# Patient Record
Sex: MALE | Race: WHITE | Employment: FULL TIME | URBAN - METROPOLITAN AREA
[De-identification: names, ages, dates, MRNs, and addresses within clinical notes are randomized per-mention and may not be internally consistent; named-entity substitution may affect disease eponyms.]

---

## 2017-05-19 DIAGNOSIS — Z00.00 ROUTINE GENERAL MEDICAL EXAMINATION AT A HEALTH CARE FACILITY: ICD-10-CM

## 2017-05-19 LAB
CHOLEST SERPL-MCNC: 252 MG/DL
ERYTHROCYTE [DISTWIDTH] IN BLOOD BY AUTOMATED COUNT: 13.2 % (ref 10–15)
GLUCOSE SERPL-MCNC: 111 MG/DL (ref 70–99)
HCT VFR BLD AUTO: 44.7 % (ref 40–53)
HDLC SERPL-MCNC: 34 MG/DL
HGB BLD-MCNC: 15.3 G/DL (ref 13.3–17.7)
LDLC SERPL CALC-MCNC: 193 MG/DL
MCH RBC QN AUTO: 29.8 PG (ref 26.5–33)
MCHC RBC AUTO-ENTMCNC: 34.2 G/DL (ref 31.5–36.5)
MCV RBC AUTO: 87 FL (ref 78–100)
NONHDLC SERPL-MCNC: 218 MG/DL
PLATELET # BLD AUTO: 313 10E9/L (ref 150–450)
RBC # BLD AUTO: 5.14 10E12/L (ref 4.4–5.9)
TRIGL SERPL-MCNC: 123 MG/DL
WBC # BLD AUTO: 9.7 10E9/L (ref 4–11)

## 2017-05-19 PROCEDURE — 36415 COLL VENOUS BLD VENIPUNCTURE: CPT | Performed by: INTERNAL MEDICINE

## 2017-05-19 PROCEDURE — 82947 ASSAY GLUCOSE BLOOD QUANT: CPT | Performed by: INTERNAL MEDICINE

## 2017-05-19 PROCEDURE — 80061 LIPID PANEL: CPT | Performed by: INTERNAL MEDICINE

## 2017-05-19 PROCEDURE — 85027 COMPLETE CBC AUTOMATED: CPT | Performed by: INTERNAL MEDICINE

## 2017-05-22 NOTE — PATIENT INSTRUCTIONS
Start the blood pressure medication and if you have side effects call.    Please work hard on exercise, diet and weight loss    Follow up with me in 2 months.    Bipin Jean M.D.            Preventive Health Recommendations  Male Ages 40 to 49    Yearly exam:             See your health care provider every year in order to  o   Review health changes.   o   Discuss preventive care.    o   Review your medicines if your doctor has prescribed any.    You should be tested each year for STDs (sexually transmitted diseases) if you re at risk.     Have a cholesterol test every 5 years.     Have a colonoscopy (test for colon cancer) if someone in your family has had colon cancer or polyps before age 50.     After age 45, have a diabetes test (fasting glucose). If you are at risk for diabetes, you should have this test every 3 years.      Talk with your health care provider about whether or not a prostate cancer screening test (PSA) is right for you.    Shots: Get a flu shot each year. Get a tetanus shot every 10 years.     Nutrition:    Eat at least 5 servings of fruits and vegetables daily.     Eat whole-grain bread, whole-wheat pasta and brown rice instead of white grains and rice.     Talk to your provider about Calcium and Vitamin D.     Lifestyle    Exercise for at least 150 minutes a week (30 minutes a day, 5 days a week). This will help you control your weight and prevent disease.     Limit alcohol to one drink per day.     No smoking.     Wear sunscreen to prevent skin cancer.     See your dentist every six months for an exam and cleaning.

## 2017-05-22 NOTE — PROGRESS NOTES
SUBJECTIVE:     CC: Dominguez Suazo is an 41 year old male who presents for preventative health visit.     The patient is not working out reg, weight a significant issue, prior high blood pressure, cholesterol and sugar as noted.  He feels fine, no ha or dizziness, no chest pain or shortness of breath.      3 weeks ago left leg sensitive on top of left foot and then edema in foot and lower left leg, right fine.  NO chest pain or shortness of breath, no h/o clots.  No mg.  He otherwise feels fine.  Has ludwin as noted by sleep study but not on cpap for it.      Healthy Habits:    Do you get at least three servings of calcium containing foods daily (dairy, green leafy vegetables, etc.)? yes    Amount of exercise or daily activities, outside of work: 7 day(s) per week    Problems taking medications regularly No    Medication side effects: No    Have you had an eye exam in the past two years? yes    Do you see a dentist twice per year? yes    Do you have sleep apnea, excessive snoring or daytime drowsiness?no            Today's PHQ-2 Score:   PHQ-2 ( 1999 Pfizer) 8/3/2016   Q1: Little interest or pleasure in doing things 0   Q2: Feeling down, depressed or hopeless 0   PHQ-2 Score 0       Abuse: Current or Past(Physical, Sexual or Emotional)- No  Do you feel safe in your environment - Yes    Social History   Substance Use Topics     Smoking status: Never Smoker     Smokeless tobacco: Never Used     Alcohol use 0.0 oz/week     0 Standard drinks or equivalent per week      Comment: occ     The patient does not drink >3 drinks per day nor >7 drinks per week.                Past Medical History:      Past Medical History:   Diagnosis Date     Elevated blood sugar      Elevated BP without diagnosis of hypertension      Hypercholesteremia     on med, zocor, years ago and ache     Normal cardiac stress test 8/16    due to fatigue with exertion     Obstructive sleep apnea (adult) (pediatric) 2016     Sleep-related  hypoventilation 2016             Past Surgical History:      Past Surgical History:   Procedure Laterality Date     APPENDECTOMY  child     MYRINGOTOMY, INSERT TUBE BILATERAL, COMBINED  child     ORTHOPEDIC SURGERY  2014    right shoulder             Social History:     Social History     Social History     Marital status:      Spouse name: N/A     Number of children: 2     Years of education: N/A     Occupational History      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[employer]      Social History Main Topics     Smoking status: Never Smoker     Smokeless tobacco: Never Used     Alcohol use 0.0 oz/week     0 Standard drinks or equivalent per week      Comment: occ     Drug use: Yes     Sexual activity: Yes     Partners: Female     Other Topics Concern     Not on file     Social History Narrative             Family History:   reviewed         Allergies:     Allergies   Allergen Reactions     Zocor [Simvastatin] Muscle Pain (Myalgia)             Medications:     Current Outpatient Prescriptions   Medication Sig Dispense Refill     lisinopril (PRINIVIL/ZESTRIL) 10 MG tablet Take 1 tablet (10 mg) by mouth daily 90 tablet 3               Review of Systems:   The 10 point Review of Systems is negative other than noted in the HPI           Physical Exam:   Blood pressure 150/90, pulse 67, temperature 98.1  F (36.7  C), temperature source Oral, height 6' (1.829 m), weight 280 lb (127 kg), SpO2 98 %.    Exam:  Constitutional: healthy appearing, alert and in no distress  Heent: Normocephalic. Head without obvious masses or lesions. PERRLDC, EOMI. Mouth exam within normal limits: tongue, mucous membranes, posterior pharynx all normal, no lesions or abnormalities seen.  Tm's and canals within normal limits bilaterally. Neck supple, no nuchal rigidity or masses. No supraclavicular, or cervical adenopathy. Thyroid symmetric, no masses.  Cardiovascular: Regular rate and rhythm, no murmer, rub or gallops.  JVP not elevated, no edema.  Carotids  within normal limits bilaterally, no bruits.  Respiratory: Normal respiratory effort.  Lungs clear, normal flow, no wheezing or crackles.  Breasts: Normal bilaterally.  No masses or lesions.  Nipples within normal limites.  No axillary lesions or nodes.  Gastrointestinal: Normal active bowel sounds.   Soft, not tender, no masses, guarding or rebound.  No hepatosplenomegaly.   Genitourinary: Rectal not done  Musculoskeletal: extremities normal, no gross deformities noted.  Left foot and ankle not red, warm or tender, normal pulses, no lesions.  Faint amt of edema left ankle area, ?some left lower leg  Skin: no suspicious lesions or rashes   Neurologic: Mental status within normal limits.  Speech fluent.  No gross motor abnormalities and gait intact.  Psychiatric: mentation appears normal and affect normal.         Data:   Labs reviewed with patient         Assessment:   1. Normal cpx  2. Hypertension, suspect essential, weight related, needs treatment  3. Elevated cholesterol, d/w patient adding med, he wants to try exercise, diet  4. Elevated sugar, high risk of diabetes, d/w patient need for exercise, diet and weight loss  5. Left leg edema, doubt pvd, doubt clot but will check  6. Alvaro, not on treatment, weight loss rec  7. hcm         Plan:   Up to date tdap  Stressed exercise, diet and weight loss  Add lisinopril 10mg  Left leg us  Follow up office visit 2 months      Bipin Jean M.D.

## 2017-05-23 ENCOUNTER — OFFICE VISIT (OUTPATIENT)
Dept: FAMILY MEDICINE | Facility: CLINIC | Age: 42
End: 2017-05-23
Payer: COMMERCIAL

## 2017-05-23 VITALS
HEART RATE: 67 BPM | TEMPERATURE: 98.1 F | OXYGEN SATURATION: 98 % | WEIGHT: 280 LBS | HEIGHT: 72 IN | BODY MASS INDEX: 37.93 KG/M2 | DIASTOLIC BLOOD PRESSURE: 90 MMHG | SYSTOLIC BLOOD PRESSURE: 150 MMHG

## 2017-05-23 DIAGNOSIS — E66.09 NON MORBID OBESITY DUE TO EXCESS CALORIES: ICD-10-CM

## 2017-05-23 DIAGNOSIS — I10 BENIGN ESSENTIAL HYPERTENSION: ICD-10-CM

## 2017-05-23 DIAGNOSIS — Z00.00 ROUTINE GENERAL MEDICAL EXAMINATION AT A HEALTH CARE FACILITY: Primary | ICD-10-CM

## 2017-05-23 DIAGNOSIS — G47.33 OBSTRUCTIVE SLEEP APNEA (ADULT) (PEDIATRIC): ICD-10-CM

## 2017-05-23 DIAGNOSIS — R60.0 LEG EDEMA, LEFT: ICD-10-CM

## 2017-05-23 DIAGNOSIS — R73.9 ELEVATED BLOOD SUGAR: ICD-10-CM

## 2017-05-23 DIAGNOSIS — E78.00 HYPERCHOLESTEREMIA: ICD-10-CM

## 2017-05-23 PROCEDURE — 99214 OFFICE O/P EST MOD 30 MIN: CPT | Mod: 25 | Performed by: INTERNAL MEDICINE

## 2017-05-23 PROCEDURE — 99396 PREV VISIT EST AGE 40-64: CPT | Performed by: INTERNAL MEDICINE

## 2017-05-23 RX ORDER — LISINOPRIL 10 MG/1
10 TABLET ORAL DAILY
Qty: 90 TABLET | Refills: 3 | Status: SHIPPED | OUTPATIENT
Start: 2017-05-23

## 2017-05-23 NOTE — NURSING NOTE
Chief Complaint   Patient presents with     Physical       Initial BP (!) 157/99  Pulse 67  Temp 98.1  F (36.7  C) (Oral)  Ht 6' (1.829 m)  Wt 280 lb (127 kg)  SpO2 98%  BMI 37.97 kg/m2 Estimated body mass index is 37.97 kg/(m^2) as calculated from the following:    Height as of this encounter: 6' (1.829 m).    Weight as of this encounter: 280 lb (127 kg).  Medication Reconciliation: maynor MERCADO CMA

## 2017-05-23 NOTE — MR AVS SNAPSHOT
After Visit Summary   5/23/2017    Dominguez Suazo    MRN: [id number]           Patient Information     Date Of Birth          1975        Visit Information        Provider Department      5/23/2017 1:30 PM Bipin Jean MD [facility]        Today's Diagnoses     Routine general medical examination at a health care facility    -  1    Elevated BP without diagnosis of hypertension        Sleep-related hypoventilation        Obstructive sleep apnea (adult) (pediatric)        Hypercholesteremia        Elevated blood sugar        Non morbid obesity due to excess calories        Leg edema, left        Benign essential hypertension          Care Instructions    Start the blood pressure medication and if you have side effects call.    Please work hard on exercise, diet and weight loss    Follow up with me in 2 months.    Bipin Jean M.D.            Preventive Health Recommendations  Male Ages 40 to 49    Yearly exam:             See your health care provider every year in order to  o   Review health changes.   o   Discuss preventive care.    o   Review your medicines if your doctor has prescribed any.    You should be tested each year for STDs (sexually transmitted diseases) if you re at risk.     Have a cholesterol test every 5 years.     Have a colonoscopy (test for colon cancer) if someone in your family has had colon cancer or polyps before age 50.     After age 45, have a diabetes test (fasting glucose). If you are at risk for diabetes, you should have this test every 3 years.      Talk with your health care provider about whether or not a prostate cancer screening test (PSA) is right for you.    Shots: Get a flu shot each year. Get a tetanus shot every 10 years.     Nutrition:    Eat at least 5 servings of fruits and vegetables daily.     Eat whole-grain bread, whole-wheat pasta and brown rice instead of white grains and rice.     Talk to your provider about Calcium and  "Vitamin D.     Lifestyle    Exercise for at least 150 minutes a week (30 minutes a day, 5 days a week). This will help you control your weight and prevent disease.     Limit alcohol to one drink per day.     No smoking.     Wear sunscreen to prevent skin cancer.     See your dentist every six months for an exam and cleaning.            Follow-ups after your visit        Future tests that were ordered for you today     Open Future Orders        Priority Expected Expires Ordered    US Lower Extremity Venous Duplex Left Routine 5/23/2017 5/30/2017 5/23/2017            Who to contact     If you have questions or need follow up information about today's clinic visit or your schedule please contact [facility] directly at [phone].  Normal or non-critical lab and imaging results will be communicated to you by Meshfirehart, letter or phone within 4 business days after the clinic has received the results. If you do not hear from us within 7 days, please contact the clinic through Meshfirehart or phone. If you have a critical or abnormal lab result, we will notify you by phone as soon as possible.  Submit refill requests through GiftLauncher or call your pharmacy and they will forward the refill request to us. Please allow 3 business days for your refill to be completed.          Additional Information About Your Visit        [employer] Information     GiftLauncher lets you send messages to your doctor, view your test results, renew your prescriptions, schedule appointments and more. To sign up, go to [email or web address] . Click on \"Log in\" on the left side of the screen, which will take you to the Welcome page. Then click on \"Sign up Now\" on the right side of the page.     You will be asked to enter the access code listed below, as well as some personal information. Please follow the directions to create your username and password.     Your access code is: [email or web address]  Expires: 8/21/2017  1:49 PM     Your access code will "  in 90 days. If you need help or a new code, please call your [facility] clinic or [phone].        Care EveryWhere ID     This is your Care EveryWhere ID. This could be used by other organizations to access your [facility] medical records  [id number]        Your Vitals Were     Pulse Temperature Height Pulse Oximetry BMI (Body Mass Index)       67 98.1  F (36.7  C) (Oral) 6' (1.829 m) 98% 37.97 kg/m2        Blood Pressure from Last 3 Encounters:   17 150/90   16 140/90   16 (!) 155/97    Weight from Last 3 Encounters:   17 280 lb (127 kg)   16 274 lb 3.2 oz (124.4 kg)   16 281 lb (127.5 kg)                 Today's Medication Changes          These changes are accurate as of: 17  1:49 PM.  If you have any questions, ask your nurse or doctor.               Start taking these medicines.        Dose/Directions    lisinopril 10 MG tablet   Commonly known as:  PRINIVIL/ZESTRIL   Used for:  Benign essential hypertension   Started by:  Bipin Jean MD        Dose:  10 mg   Take 1 tablet (10 mg) by mouth daily   Quantity:  90 tablet   Refills:  3            Where to get your medicines      Some of these will need a paper prescription and others can be bought over the counter.  Ask your nurse if you have questions.     Bring a paper prescription for each of these medications     lisinopril 10 MG tablet                Primary Care Provider Office Phone # Fax #    Bipin Jean [phone]       [facility] [address] [facility] [address]  [facility] [address]        Thank you!     Thank you for choosing [facility]  for your care. Our goal is always to provide you with excellent care. Hearing back from our patients is one way we can continue to improve our services. Please take a few minutes to complete the written survey that you may receive in the mail after your visit with us. Thank you!             Your Updated Medication List -  Protect others around you: Learn how to safely use, store and throw away your medicines at www.disposemymeds.org.          This list is accurate as of: 5/23/17  1:49 PM.  Always use your most recent med list.                   Brand Name Dispense Instructions for use    lisinopril 10 MG tablet    PRINIVIL/ZESTRIL    90 tablet    Take 1 tablet (10 mg) by mouth daily

## 2017-05-24 ENCOUNTER — TELEPHONE (OUTPATIENT)
Dept: FAMILY MEDICINE | Facility: CLINIC | Age: 42
End: 2017-05-24

## 2017-05-24 ENCOUNTER — HOSPITAL ENCOUNTER (OUTPATIENT)
Dept: ULTRASOUND IMAGING | Facility: CLINIC | Age: 42
Discharge: HOME | End: 2017-05-24
Attending: INTERNAL MEDICINE | Admitting: INTERNAL MEDICINE
Payer: COMMERCIAL

## 2017-05-24 DIAGNOSIS — R60.0 LEG EDEMA, LEFT: ICD-10-CM

## 2017-05-24 PROCEDURE — 93971 EXTREMITY STUDY: CPT | Mod: LT

## 2017-05-24 NOTE — TELEPHONE ENCOUNTER
Please call patient with leg us report, no signs of dvt, if edema persists call    Bipin Jean M.D.

## 2017-05-26 ENCOUNTER — OFFICE VISIT (OUTPATIENT)
Dept: FAMILY MEDICINE | Facility: CLINIC | Age: 42
End: 2017-05-26
Payer: COMMERCIAL

## 2017-05-26 VITALS
WEIGHT: 282 LBS | HEIGHT: 72 IN | SYSTOLIC BLOOD PRESSURE: 138 MMHG | TEMPERATURE: 98.1 F | DIASTOLIC BLOOD PRESSURE: 88 MMHG | OXYGEN SATURATION: 97 % | BODY MASS INDEX: 38.19 KG/M2 | HEART RATE: 65 BPM

## 2017-05-26 DIAGNOSIS — L03.116 CELLULITIS OF LEFT LOWER EXTREMITY: Primary | ICD-10-CM

## 2017-05-26 DIAGNOSIS — I10 BENIGN ESSENTIAL HYPERTENSION: ICD-10-CM

## 2017-05-26 PROCEDURE — 99213 OFFICE O/P EST LOW 20 MIN: CPT | Performed by: INTERNAL MEDICINE

## 2017-05-26 RX ORDER — CEPHALEXIN 500 MG/1
500 CAPSULE ORAL 3 TIMES DAILY
Qty: 30 CAPSULE | Refills: 0 | Status: SHIPPED | OUTPATIENT
Start: 2017-05-26 | End: 2020-03-24

## 2017-05-26 NOTE — NURSING NOTE
No chief complaint on file.      Initial BP (!) 155/91  Pulse 65  Temp 98.1  F (36.7  C) (Oral)  Ht 6' (1.829 m)  Wt 282 lb (127.9 kg)  SpO2 97%  BMI 38.25 kg/m2 Estimated body mass index is 38.25 kg/(m^2) as calculated from the following:    Height as of this encounter: 6' (1.829 m).    Weight as of this encounter: 282 lb (127.9 kg).  Medication Reconciliation: complete   Palak MERCADO, CMA

## 2017-05-26 NOTE — PATIENT INSTRUCTIONS
Start the antibiotic, try and keep your left leg elevated as much as possible, try to stay off your feet as much as possible.  Use heat to the leg 3x a day.  If this worsens or is not resolving soon call    Bipin Jean M.D.

## 2017-05-26 NOTE — PROGRESS NOTES
Dominguez Suazo is a 41 year old male who presents for left foot issue.  Patient seen by me 3 days ago for cpx and noted it then, us neg for dvt.  No h/o this before, no h/o clot or vascular dz, cellulitis.  No trauma.  He notes started with discomfort top of foot and slight pain left medial thigh and lower leg, then gone, then sensitivity left lower leg medial side, then gone, then some edema and sensitivity to left foot.  Now a bit more swollen and red.  Min if any pain, no f,c,s.  No n/v, not ill.  Has otherwise been fine.  On acei now, blood pressure a bit better    Past Medical History:   Diagnosis Date     Benign essential hypertension 05/2017    added lisinopril     Elevated blood sugar      Elevated BP without diagnosis of hypertension      Hypercholesteremia     on med, zocor, years ago and ache     Normal cardiac stress test 8/16    due to fatigue with exertion     Obstructive sleep apnea (adult) (pediatric) 2016     Sleep-related hypoventilation 2016     Past Surgical History:   Procedure Laterality Date     APPENDECTOMY  child     MYRINGOTOMY, INSERT TUBE BILATERAL, COMBINED  child     ORTHOPEDIC SURGERY  2014    right shoulder     Social History     Social History     Marital status:      Spouse name: N/A     Number of children: 2     Years of education: N/A     Occupational History      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[employer]      Social History Main Topics     Smoking status: Never Smoker     Smokeless tobacco: Never Used     Alcohol use 0.0 oz/week     0 Standard drinks or equivalent per week      Comment: occ     Drug use: Yes     Sexual activity: Yes     Partners: Female     Other Topics Concern     Not on file     Social History Narrative     Current Outpatient Prescriptions   Medication Sig Dispense Refill     cephALEXin (KEFLEX) 500 MG capsule Take 1 capsule (500 mg) by mouth 3 times daily 30 capsule 0     lisinopril (PRINIVIL/ZESTRIL) 10 MG tablet Take 1 tablet (10 mg) by mouth daily 90 tablet 3      Allergies   Allergen Reactions     Zocor [Simvastatin] Muscle Pain (Myalgia)     FAMILY HISTORY NOTED AND REVIEWED    REVIEW OF SYSTEMS: above    PHYSICAL EXAM    /88  Pulse 65  Temp 98.1  F (36.7  C) (Oral)  Ht 6' (1.829 m)  Wt 282 lb (127.9 kg)  SpO2 97%  BMI 38.25 kg/m2    Patient appears non toxic  Right leg, ankle, foot and toes within normal limits  Left leg within normal limits, ankle within normal limits, not red, tender  Top of left foot, distally with slight amt of redness, no streaking, no masses. Bottom of foot and toes within normal limits, intact pulses.  No signs clot.  Tender prox toes/foot and red, a bit tender prox 2nd toe.    ASSESSMENT:  1. Probable cellulitis, doubt this is arterial, emboli, clot, or fx, doubt gout  2. Hypertension, improved    PLAN:  Keflex 500mg tid for 10 days  Heat, leg elevation, try and stay off it  Call if worsens, not resolvling soon    Bipin Jean M.D.

## 2020-03-24 ENCOUNTER — VIRTUAL VISIT (OUTPATIENT)
Dept: FAMILY MEDICINE | Facility: CLINIC | Age: 45
End: 2020-03-24
Payer: COMMERCIAL

## 2020-03-24 DIAGNOSIS — R05.9 COUGH: Primary | ICD-10-CM

## 2020-03-24 DIAGNOSIS — R42 LIGHTHEADEDNESS: ICD-10-CM

## 2020-03-24 PROCEDURE — 99213 OFFICE O/P EST LOW 20 MIN: CPT | Mod: TEL | Performed by: INTERNAL MEDICINE

## 2020-03-24 NOTE — PROGRESS NOTES
"Dominguez Suazo is a 44 year old male who is being evaluated via a telephone visit.      The patient has been notified of following (by CHANCE Arzola CMA on 3/24/2020 at 10:41 AM       \"We have found that certain health care needs can be provided without the need for a physical exam.  This service lets us provide the care you need with a short phone conversation.  If a prescription is necessary we can send it directly to your pharmacy.  If lab work is needed we can place an order for that and you can then stop by our lab to have the test done at a later time.    This telephone visit will be conducted via 3 way call with the you (the patient) , the physician/provider, and a me all on the line at the same time.  This allows your physician/provider to have the phone conversation with you while I will be taking notes for your medical record.  We will have full access to your [facility] medical record during this entire phone call.    Since this is like an office visit,  will bill your insurance company for this service.  Please check with your medical insurance if this type of telephone/virtual is covered . You may be responsible for the cost of this service if insurance coverage is denied.  The typical cost is $30 (10min), $59(11-20min) and $85 (21-30min)     If during the course of the call the physician/provider feels a telephone visit is not appropriate, you will not be charged for this service\"    Consent has been obtained for this service by care team member: yes.  See the scanned image in the medical record.    I called patient, one month ago ill with cough that was deep, some colored phlegm.  No fever or chills, some head pressure.  Had this one week then better and cough dry at that time.  Then some eye crusting.  Now this past weekend laughed hard and then coughed, felt shortness of breath and very weak during that episode.  Felt disoriented.  Lasted a second or 2, then fine.  Now when coughs can get " lightheaded, not other times.  No fever, not shortness of breath, no gi or genitourinary symptoms.  Cough is improved in last 2 days, more in am.  No ear ache or s.t now.   Not having nasal symptoms or sinus symptoms.    At this time sounds more viral, doubt covid, since improving would wait, but told to call if worsens, shortness of breath, fever or non-tender resolving soon or more light headedness.    Time 6:35    Bipin Jean M.D.

## 2021-04-09 NOTE — MR AVS SNAPSHOT
"              After Visit Summary   5/26/2017    Dominguez Suazo    MRN: [id number]           Patient Information     Date Of Birth          1975        Visit Information        Provider Department      5/26/2017 2:30 PM Bipin eJan MD [facility]        Today's Diagnoses     Cellulitis of left lower extremity    -  1    Benign essential hypertension          Care Instructions    Start the antibiotic, try and keep your left leg elevated as much as possible, try to stay off your feet as much as possible.  Use heat to the leg 3x a day.  If this worsens or is not resolving soon call    Bipin Jean M.D.            Follow-ups after your visit        Who to contact     If you have questions or need follow up information about today's clinic visit or your schedule please contact [facility] directly at [phone].  Normal or non-critical lab and imaging results will be communicated to you by [employer], letter or phone within 4 business days after the clinic has received the results. If you do not hear from us within 7 days, please contact the clinic through MyChart or phone. If you have a critical or abnormal lab result, we will notify you by phone as soon as possible.  Submit refill requests through [employer] or call your pharmacy and they will forward the refill request to us. Please allow 3 business days for your refill to be completed.          Additional Information About Your Visit        MyChart Information     [employer] lets you send messages to your doctor, view your test results, renew your prescriptions, schedule appointments and more. To sign up, go to [email or web address] . Click on \"Log in\" on the left side of the screen, which will take you to the Welcome page. Then click on \"Sign up Now\" on the right side of the page.     You will be asked to enter the access code listed below, as well as some personal information. Please follow the directions to create your username " Covering for provider who is on leave. I can not tell if patient should be taking or not. Patient appeared to be taking medication during visit and BP was controlled, so since no allergies or contraindications listed, and patient currently taking-would refill medication.    and password.     Your access code is: [email or web address]  Expires: 2017  1:49 PM     Your access code will  in 90 days. If you need help or a new code, please call your [facility] clinic or [phone].        Care EveryWhere ID     This is your Care EveryWhere ID. This could be used by other organizations to access your [facility] medical records  [id number]        Your Vitals Were     Pulse Temperature Height Pulse Oximetry BMI (Body Mass Index)       65 98.1  F (36.7  C) (Oral) 6' (1.829 m) 97% 38.25 kg/m2        Blood Pressure from Last 3 Encounters:   17 138/88   17 150/90   16 140/90    Weight from Last 3 Encounters:   17 282 lb (127.9 kg)   17 280 lb (127 kg)   16 274 lb 3.2 oz (124.4 kg)              Today, you had the following     No orders found for display         Today's Medication Changes          These changes are accurate as of: 17  2:44 PM.  If you have any questions, ask your nurse or doctor.               Start taking these medicines.        Dose/Directions    cephALEXin 500 MG capsule   Commonly known as:  KEFLEX   Used for:  Cellulitis of left lower extremity   Started by:  Bipin Jean MD        Dose:  500 mg   Take 1 capsule (500 mg) by mouth 3 times daily   Quantity:  30 capsule   Refills:  0            Where to get your medicines      These medications were sent to [facility], [place] - [address], [address]  [address], [address], [facility] [address]     Phone:  [phone]     cephALEXin 500 MG capsule                Primary Care Provider Office Phone # Fax #    Bipin Jean [phone]       [facility] [address]  [facility] [address]        Thank you!     Thank you for choosing [facility]  for your care. Our goal is always to provide you with excellent care. Hearing back from our patients is one way we can continue to improve our  services. Please take a few minutes to complete the written survey that you may receive in the mail after your visit with us. Thank you!             Your Updated Medication List - Protect others around you: Learn how to safely use, store and throw away your medicines at www.disposemymeds.org.          This list is accurate as of: 5/26/17  2:45 PM.  Always use your most recent med list.                   Brand Name Dispense Instructions for use    cephALEXin 500 MG capsule    KEFLEX    30 capsule    Take 1 capsule (500 mg) by mouth 3 times daily       lisinopril 10 MG tablet    PRINIVIL/ZESTRIL    90 tablet    Take 1 tablet (10 mg) by mouth daily

## 2025-02-11 ENCOUNTER — OFFICE VISIT (OUTPATIENT)
Dept: URGENT CARE | Facility: URGENT CARE | Age: 50
End: 2025-02-11
Payer: COMMERCIAL

## 2025-02-11 VITALS
SYSTOLIC BLOOD PRESSURE: 145 MMHG | DIASTOLIC BLOOD PRESSURE: 93 MMHG | WEIGHT: 265 LBS | HEART RATE: 78 BPM | RESPIRATION RATE: 16 BRPM | TEMPERATURE: 99.3 F | OXYGEN SATURATION: 96 % | BODY MASS INDEX: 35.94 KG/M2

## 2025-02-11 DIAGNOSIS — R05.1 ACUTE COUGH: ICD-10-CM

## 2025-02-11 DIAGNOSIS — R68.89 FLU-LIKE SYMPTOMS: Primary | ICD-10-CM

## 2025-02-11 LAB
FLUAV AG SPEC QL IA: NEGATIVE
FLUBV AG SPEC QL IA: NEGATIVE

## 2025-02-11 PROCEDURE — 87635 SARS-COV-2 COVID-19 AMP PRB: CPT | Performed by: PEDIATRICS

## 2025-02-11 PROCEDURE — 87804 INFLUENZA ASSAY W/OPTIC: CPT | Performed by: PEDIATRICS

## 2025-02-11 ASSESSMENT — ENCOUNTER SYMPTOMS
SORE THROAT: 0
CHILLS: 0
HEADACHES: 1
SHORTNESS OF BREATH: 0
FATIGUE: 1
FEVER: 0
RHINORRHEA: 1
SINUS PAIN: 1
COUGH: 1

## 2025-02-11 NOTE — PROGRESS NOTES
Patient presents with:  Urgent Care  URI: Cold sx x 5 days  Congestion, dizziness, soreness in neck, HA back of the eye, mild cough  Denies fever and chills      Clinical Decision Making:      ICD-10-CM    1. Flu-like symptoms  R68.89 Influenza A & B Antigen - Clinic Collect      2. Acute cough  R05.1 COVID-19 Virus (Coronavirus) by PCR Nose      - Flu negative  - Prescribed augmentin and provided instructions for when to start for sinusitis  - When COVID results, please call Dominguez to notify of results, positive or negative.    There are no Patient Instructions on file for this visit.    HPI:  Dominguez Suazo is a 49 year old male who presents today complaining of URI symptoms. Body soreness is the most bothersome. Congestion comes and goes., which is congestion. No fever or chills.     No sinus tenderness. Ears feel plugged but not painful. Nasal drainage is thin, not mucusy. Took tylenol this morning. Dayquil taken this morning.     Illness started 4 days ago.     Wife is also sick, no diagnosis.     History obtained from the patient.    Problem List:  2017-05: Benign essential hypertension  2016-08: Essential hypertension with goal blood pressure less than   140/90  2016-08: Non morbid obesity due to excess calories  2016-01: Obstructive sleep apnea (adult) (pediatric)  2016-01: Sleep-related hypoventilation  Hypercholesteremia  Elevated blood sugar  Elevated BP without diagnosis of hypertension      Past Medical History:   Diagnosis Date    Benign essential hypertension 05/2017    added lisinopril    Elevated blood sugar     Hypercholesteremia     on med, zocor, years ago and ache    Normal cardiac stress test 8/16    due to fatigue with exertion    Obstructive sleep apnea (adult) (pediatric) 2016    Sleep-related hypoventilation 2016       Social History     Tobacco Use    Smoking status: Never    Smokeless tobacco: Never   Substance Use Topics    Alcohol use: Yes     Alcohol/week: 0.0 standard drinks of  alcohol     Comment: occ       Review of Systems   Constitutional:  Positive for fatigue. Negative for chills and fever.   HENT:  Positive for congestion, rhinorrhea and sinus pain. Negative for ear pain and sore throat.    Respiratory:  Positive for cough. Negative for shortness of breath.    Neurological:  Positive for headaches.       Vitals:    02/11/25 1548   BP: (!) 145/93   BP Location: Left arm   Patient Position: Sitting   Cuff Size: Adult Large   Pulse: 78   Resp: 16   Temp: 99.3  F (37.4  C)   TempSrc: Oral   SpO2: 96%   Weight: 120.2 kg (265 lb)       Physical Exam  Constitutional:       General: He is not in acute distress.     Appearance: He is normal weight.   HENT:      Right Ear: External ear normal.      Left Ear: External ear normal.      Nose: Nose normal.   Eyes:      Conjunctiva/sclera: Conjunctivae normal.   Cardiovascular:      Rate and Rhythm: Normal rate.   Pulmonary:      Effort: Pulmonary effort is normal. No respiratory distress.      Breath sounds: Normal breath sounds. No wheezing or rales.   Musculoskeletal:      Cervical back: Normal range of motion.   Neurological:      General: No focal deficit present.      Mental Status: He is alert and oriented to person, place, and time.         Results:  No results found for any visits on 02/11/25.      At the end of the encounter, I discussed results, diagnosis, medications. Discussed indications for follow up if no improvement. Patient understood and agreed to plan. Patient was stable for discharge.    Dominguez Molina MD, MPH

## 2025-02-11 NOTE — PATIENT INSTRUCTIONS
Take the antibiotic (Augmentin) if any of the following happen:  - If the illness lasts longer than 10 days  - If you feel improved but then worsen  - If you have a fever of 102  - If you have severe pain/symptoms for 48 hours (headache, sinus pressure, or thick green nasal drainage)

## 2025-02-12 LAB — SARS-COV-2 RNA RESP QL NAA+PROBE: NEGATIVE
